# Patient Record
Sex: FEMALE | Race: WHITE | NOT HISPANIC OR LATINO | Employment: OTHER | URBAN - METROPOLITAN AREA
[De-identification: names, ages, dates, MRNs, and addresses within clinical notes are randomized per-mention and may not be internally consistent; named-entity substitution may affect disease eponyms.]

---

## 2021-03-15 ENCOUNTER — BULK ORDERING (OUTPATIENT)
Dept: CASE MANAGEMENT | Facility: OTHER | Age: 76
End: 2021-03-15

## 2021-03-15 DIAGNOSIS — Z23 IMMUNIZATION DUE: ICD-10-CM

## 2023-02-18 ENCOUNTER — APPOINTMENT (OUTPATIENT)
Dept: GENERAL RADIOLOGY | Facility: HOSPITAL | Age: 78
End: 2023-02-18
Payer: MEDICARE

## 2023-02-18 PROCEDURE — 72110 X-RAY EXAM L-2 SPINE 4/>VWS: CPT

## 2023-02-18 PROCEDURE — 99283 EMERGENCY DEPT VISIT LOW MDM: CPT

## 2023-02-19 ENCOUNTER — HOSPITAL ENCOUNTER (EMERGENCY)
Facility: HOSPITAL | Age: 78
Discharge: HOME | End: 2023-02-19
Attending: EMERGENCY MEDICINE | Admitting: EMERGENCY MEDICINE
Payer: MEDICARE

## 2023-02-19 VITALS
DIASTOLIC BLOOD PRESSURE: 78 MMHG | SYSTOLIC BLOOD PRESSURE: 145 MMHG | RESPIRATION RATE: 18 BRPM | HEART RATE: 69 BPM | TEMPERATURE: 97.8 F | OXYGEN SATURATION: 96 % | HEIGHT: 66 IN | BODY MASS INDEX: 32.28 KG/M2

## 2023-02-19 DIAGNOSIS — M54.41 ACUTE MIDLINE LOW BACK PAIN WITH RIGHT-SIDED SCIATICA: Primary | ICD-10-CM

## 2023-02-19 PROCEDURE — 63710000001 PREDNISONE PER 1 MG: Performed by: EMERGENCY MEDICINE

## 2023-02-19 RX ORDER — PREDNISONE 20 MG/1
60 TABLET ORAL DAILY
Qty: 15 TABLET | Refills: 0 | Status: SHIPPED | OUTPATIENT
Start: 2023-02-19 | End: 2023-02-24

## 2023-02-19 RX ORDER — HYDROCODONE BITARTRATE AND ACETAMINOPHEN 7.5; 325 MG/1; MG/1
1 TABLET ORAL ONCE
Status: COMPLETED | OUTPATIENT
Start: 2023-02-19 | End: 2023-02-19

## 2023-02-19 RX ORDER — METHOCARBAMOL 500 MG/1
1000 TABLET, FILM COATED ORAL 4 TIMES DAILY
Qty: 40 TABLET | Refills: 0 | Status: SHIPPED | OUTPATIENT
Start: 2023-02-19

## 2023-02-19 RX ORDER — PREDNISONE 20 MG/1
60 TABLET ORAL ONCE
Status: COMPLETED | OUTPATIENT
Start: 2023-02-19 | End: 2023-02-19

## 2023-02-19 RX ADMIN — HYDROCODONE BITARTRATE AND ACETAMINOPHEN 1 TABLET: 7.5; 325 TABLET ORAL at 01:27

## 2023-02-19 RX ADMIN — PREDNISONE 60 MG: 20 TABLET ORAL at 01:27

## 2023-02-19 NOTE — ED PROVIDER NOTES
EMERGENCY DEPARTMENT ENCOUNTER    Room Number:  04/04  Date of encounter:  2/19/2023  PCP: Kaitlynn Robles MD  Patient Care Team:  Kaitlynn Robles MD as PCP - General  Kaitlynn Robles MD as PCP - Family Medicine   Independent Historians: patient    HPI:  Chief Complaint: Low back pain  A complete HPI/ROS/PMH/PSH/SH/FH are unobtainable due to: N/A    Chronic or social conditions impacting patient care (social determinants of health): None    Context: Samreen Ortiz is a 77 y.o. female with past medical history of chronic back and neck pain, HLD, osteoporosis, and hypothyroidism who arrives to the ED with complaint of low back pain that radiates to the right hip.  Patient states her symptoms started 4 days ago after recent increased activity including moving furniture in the house.  Patient denies any numbness or tingling going down the leg, no fever or chills, no loss of bladder or bowel control, urinary retention, no saddle paresthesias, denies any history of diabetes or IV drug use.    Review of prior external notes (non-ED): None    Review of prior external test results outside of this encounter: None    PAST MEDICAL HISTORY  Active Ambulatory Problems     Diagnosis Date Noted   • No Active Ambulatory Problems     Resolved Ambulatory Problems     Diagnosis Date Noted   • No Resolved Ambulatory Problems     Past Medical History:   Diagnosis Date   • Hyperlipidemia    • Osteoporosis    • Thyroid disease        The patient has a COVID HM Topic on their chart, and they are fully vaccinated.    PAST SURGICAL HISTORY  Past Surgical History:   Procedure Laterality Date   • BACK SURGERY      8/26/14-open revision right L4-L5 microdecompression-Dr. Hanks   • SHOULDER SURGERY           FAMILY HISTORY  Family History   Problem Relation Age of Onset   • Diabetes Other    • Heart failure Other    • Other Other         Subdural Hematoma         SOCIAL HISTORY  Social History     Socioeconomic History   • Marital status:     Tobacco Use   • Smoking status: Never         ALLERGIES  Penicillins, Statins, and Sulfa antibiotics        REVIEW OF SYSTEMS  Review of Systems     All systems reviewed and negative except for those discussed in HPI.       PHYSICAL EXAM    I have reviewed the triage vital signs and nursing notes.    ED Triage Vitals [02/18/23 2309]   Temp Heart Rate Resp BP SpO2   97.8 °F (36.6 °C) 69 18 164/97 96 %      Temp src Heart Rate Source Patient Position BP Location FiO2 (%)   Tympanic Monitor Sitting Right arm --       Physical Exam    GENERAL: alert and oriented x4, not distressed  HENT: normocephalic, atraumatic, moist membranes  EYES: no scleral icterus, PERRL, EOMI  CV: regular rhythm, regular rate, intact pedal pulses  RESPIRATORY: normal effort, CTAB  ABDOMEN: soft/nontender, no pulsatile masses  MUSCULOSKELETAL: no deformity, decreased ROM secondary to pain in the lower back, tenderness to palpate across back without step-offs  NEURO: alert, moves all extremities, patellar DTRs equal bilaterally, negative straight leg raises, follows commands  SKIN: warm, dry, no rash   Psych: Appropriate mood and affect      Nursing notes and vital signs reviewed      LAB RESULTS  No results found for this or any previous visit (from the past 24 hour(s)).    Ordered the above labs and independently reviewed and interpreted the results by me.        RADIOLOGY  XR Spine Lumbar Complete 4+VW    Result Date: 2/19/2023  4 VIEWS LUMBAR SPINE  HISTORY: Fall  COMPARISON: 08/11/2014  FINDINGS: No acute fracture or subluxation of the lumbar spine is identified. There is retrolisthesis of L3 on L4, which was also present on prior study. There is mild anterolisthesis of L4 on L5, which is also stable. There is increasing anterolisthesis of L5 on S1 when compared to the prior study. 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 Intervertebral disc space narrowing is most significant at L2-L3 and L3-L4. Disc space narrowing at  L3-L4 has increased when compared to the prior study.      No acute fracture or subluxation identified.  This report was finalized on 2/19/2023 12:06 AM by Dr. Deedee Han M.D.        I ordered the above noted radiological studies.  These were independently interpreted and reviewed by me.  See dictation for official radiology interpretation.      PROCEDURES    Procedures      MEDICATIONS GIVEN IN ER    Medications   HYDROcodone-acetaminophen (NORCO) 7.5-325 MG per tablet 1 tablet (1 tablet Oral Given 2/19/23 0127)   predniSONE (DELTASONE) tablet 60 mg (60 mg Oral Given 2/19/23 0127)         PROGRESS, DATA ANALYSIS, CONSULTS, AND MEDICAL DECISION MAKING    All labs have been independently reviewed by me.  All radiology studies have been reviewed by me and discussed with radiologist dictating the report.   EKG's independently viewed and interpreted by me.  Discussion below represents my analysis of pertinent findings related to patient's condition, differential diagnosis, treatment plan and final disposition.    DDx:  Includes, but is not limited to low back pain, chronic back pain, lumbar strain, sciatica, lumbar radiculopathy         MDM: Patient's evaluation is consistent with low back pain with right-sided sciatica.  Patient is already on Lyrica and Norco from pain management, will prescribe short course of steroids and muscle relaxers and recommend follow-up with her pain management and/or back specialist.    PPE:  The patient wore a mask and I wore a mask and all appropriate PPE throughout the entire patient encounter.      AS OF 03:36 EST VITALS:    BP - 145/78  HR - 69  TEMP - 97.8 °F (36.6 °C) (Tympanic)  O2 SATS - 96%      DIAGNOSIS  Final diagnoses:   Acute midline low back pain with right-sided sciatica         DISPOSITION  DISCHARGE    Patient discharged in stable condition.    Reviewed implications of results, diagnosis, meds, responsibility to follow up, warning signs and symptoms of possible  worsening, potential complications and reasons to return to ER.    Patient/Family voiced understanding of above instructions.    Discussed plan for discharge, as there is no emergent indication for admission. Patient referred to primary care provider for BP management due to today's BP. Pt/family is agreeable and understands need for follow up and repeat testing.  Pt is aware that discharge does not mean that nothing is wrong but it indicates no emergency is present that requires admission and they must continue care with follow-up as given below or physician of their choice.     FOLLOW-UP  Kaitlynn Robles MD  [address]  [address]  [facility] [id number] [phone]    Schedule an appointment as soon as possible for a visit       Mayito Leon MD  [address]  [address]  [facility] [id number] [phone]    Schedule an appointment as soon as possible for a visit            Medication List      New Prescriptions    methocarbamol 500 MG tablet  Commonly known as: Robaxin  Take 2 tablets by mouth 4 (Four) Times a Day.     predniSONE 20 MG tablet  Commonly known as: DELTASONE  Take 3 tablets by mouth Daily for 5 days.           Where to Get Your Medications      These medications were sent to [id number] - [place], [place] - [address] AT [address] [facility] - [phone]  - [phone]   [address] [address]    Phone: [phone]   · methocarbamol 500 MG tablet  · predniSONE 20 MG tablet           Note Disclaimer: At [facility], we believe that sharing information builds trust and better relationships. You are receiving this note because you recently visited [facility]. It is possible you will see health information before a provider has talked with you about it. This kind of information can be easy to misunderstand. To help you fully understand what it means for your health, we urge you to discuss this note with your  provider.     Naseem Sanchez, PA  02/19/23 0330

## 2023-02-19 NOTE — ED TRIAGE NOTES
"Patient to ED from ED room 4, to triage in hospital wheelchair, w/ reports of increased R hip pain x3-4 days. Patient reports history of 2 back surgeries, epidurals in R hip, sciatica, \"narrowing of spine.\" Patient denies recent injury; states she currently has trouble walking due to pain.    Patient and staff w/ appropriate PPE in place throughout encounter.  "

## 2023-02-19 NOTE — DISCHARGE INSTRUCTIONS
Home, rest, medicine as directed, home medicine as prescribed, follow up with your back specialist, pain management, and PCP for recheck and further evaluation. Return to care if symptoms worsen or with further concerns.

## 2023-02-24 NOTE — ED PROVIDER NOTES
MD ATTESTATION NOTE    The EMILIANO and I have discussed this patient's history, physical exam, and treatment plan.    I provided a substantive portion of the care of this patient. I personally performed the physical exam, in its entirety. The attached note describes my personal findings.      Samreen Ortiz is a 77 y.o. female who presents to the ED c/o low back pain.  Onset 4 days ago.  No change in bowel or bladder.  No fever.      On exam:  GENERAL: not distressed  HENT: nares patent  EYES: no scleral icterus  CV: regular rhythm, regular rate  RESPIRATORY: normal effort  ABDOMEN: soft, nontender  MUSCULOSKELETAL: no deformity, diffuse tenderness to the lumbar spine  NEURO: alert, moves all extremities with 5/5 strength to legs particularly with hip flexion, knee extension, dorsiflexion and plantarflexion, normal sensation to light touch, follows commands  SKIN: warm, dry    Labs  No results found for this or any previous visit (from the past 24 hour(s)).    Radiology  No Radiology Exams Resulted Within Past 24 Hours    Medications given in the ED:  Medications   HYDROcodone-acetaminophen (NORCO) 7.5-325 MG per tablet 1 tablet (1 tablet Oral Given 2/19/23 0127)   predniSONE (DELTASONE) tablet 60 mg (60 mg Oral Given 2/19/23 0127)       Orders placed during this visit:  Orders Placed This Encounter   Procedures   • XR Spine Lumbar Complete 4+VW           PPE: Both the patient and I wore a surgical mask throughout the entire patient encounter.     Diagnosis  Final diagnoses:   Acute midline low back pain with right-sided sciatica        Mayito Richardson II, MD  02/23/23 1415